# Patient Record
Sex: FEMALE | Race: WHITE | NOT HISPANIC OR LATINO | Employment: FULL TIME | URBAN - METROPOLITAN AREA
[De-identification: names, ages, dates, MRNs, and addresses within clinical notes are randomized per-mention and may not be internally consistent; named-entity substitution may affect disease eponyms.]

---

## 2017-03-27 ENCOUNTER — OFFICE VISIT (OUTPATIENT)
Dept: FAMILY MEDICINE CLINIC | Facility: CLINIC | Age: 32
End: 2017-03-27

## 2017-03-27 VITALS
OXYGEN SATURATION: 99 % | WEIGHT: 211 LBS | BODY MASS INDEX: 33.91 KG/M2 | DIASTOLIC BLOOD PRESSURE: 78 MMHG | TEMPERATURE: 98.3 F | SYSTOLIC BLOOD PRESSURE: 124 MMHG | HEIGHT: 66 IN | HEART RATE: 102 BPM

## 2017-03-27 DIAGNOSIS — H81.12 BPPV (BENIGN PAROXYSMAL POSITIONAL VERTIGO), LEFT: ICD-10-CM

## 2017-03-27 DIAGNOSIS — H65.92 MIDDLE EAR EFFUSION, LEFT: Primary | ICD-10-CM

## 2017-03-27 PROCEDURE — 99213 OFFICE O/P EST LOW 20 MIN: CPT | Performed by: NURSE PRACTITIONER

## 2017-03-27 RX ORDER — FLUTICASONE PROPIONATE 50 MCG
2 SPRAY, SUSPENSION (ML) NASAL DAILY
Qty: 1 EACH | Refills: 0 | Status: SHIPPED | OUTPATIENT
Start: 2017-03-27 | End: 2017-04-26

## 2017-03-27 NOTE — PROGRESS NOTES
"Subjective   Fiorella Smith is a 31 y.o. female who presents c/o dizzy spells x 1 month.     History of Present Illness   From sit stand, and walking feels must sit as occ seeing black spots. Has never experienced similar before. No room spinning. More off balance. Has also been sick since Friday with stomach bug. Though dizzy spells ongoing 1 month. Spells are not associated with stress or work. Can occur in the evening at home as well. Takes a minute or so to settle if stops. Yesterday had episode after workout at the gym. Does not think dehydrated, as was eating jello. gatorade and water    Does get headaches off an on for months, now more frequent, mostly hatband type. Takes ibuprofen 800, takes care of very well.   The following portions of the patient's history were reviewed and updated as appropriate: allergies, current medications, past family history, past medical history, past social history, past surgical history and problem list.    Review of Systems   Constitutional: Negative.    HENT: Positive for congestion.    Cardiovascular: Negative.  Negative for chest pain and palpitations.   Gastrointestinal: Positive for diarrhea and vomiting (day 1 only). Negative for constipation and nausea.   Allergic/Immunologic: Negative for environmental allergies.   Neurological: Positive for dizziness, light-headedness and headaches. Negative for tremors, seizures, syncope, facial asymmetry, speech difficulty, weakness and numbness.   Hematological: Negative.    Psychiatric/Behavioral: Negative.      /78 (BP Location: Right arm, Patient Position: Standing, Cuff Size: Adult)  Pulse 102  Temp 98.3 °F (36.8 °C) (Oral)   Ht 66\" (167.6 cm)  Wt 211 lb (95.7 kg)  SpO2 99%  BMI 34.06 kg/m2    Objective   Physical Exam   Constitutional: She is oriented to person, place, and time. She appears well-developed and well-nourished.   HENT:   Right Ear: No middle ear effusion.   Left Ear: A middle ear effusion is present. "   Nose: Mucosal edema and rhinorrhea present.   Mouth/Throat: Posterior oropharyngeal erythema present.   Neck: Normal range of motion. Neck supple. No JVD present. No tracheal deviation present. No thyromegaly present.   Cardiovascular: Normal rate, regular rhythm and normal heart sounds.    Pulmonary/Chest: Effort normal.   Lymphadenopathy:     She has no cervical adenopathy.   Neurological: She is alert and oriented to person, place, and time.   [place] + L   Psychiatric: She has a normal mood and affect. Her behavior is normal. Judgment and thought content normal.   Nursing note and vitals reviewed.    Assessment/Plan   Problems Addressed this Visit     None      Visit Diagnoses     Middle ear effusion, left    -  Primary    BPPV (benign paroxysmal positional vertigo), left            No worrisome findings on orthostatics. Effusion likely contributory, address with flonase and FU if not settling 1 month

## 2017-06-21 ENCOUNTER — OFFICE VISIT (OUTPATIENT)
Dept: FAMILY MEDICINE CLINIC | Facility: CLINIC | Age: 32
End: 2017-06-21

## 2017-06-21 VITALS
HEART RATE: 82 BPM | HEIGHT: 66 IN | DIASTOLIC BLOOD PRESSURE: 76 MMHG | BODY MASS INDEX: 33.75 KG/M2 | WEIGHT: 210 LBS | SYSTOLIC BLOOD PRESSURE: 124 MMHG | OXYGEN SATURATION: 96 % | TEMPERATURE: 98.5 F

## 2017-06-21 DIAGNOSIS — R89.9 ABNORMAL LABORATORY TEST RESULT: ICD-10-CM

## 2017-06-21 DIAGNOSIS — Z00.00 ROUTINE GENERAL MEDICAL EXAMINATION AT A HEALTH CARE FACILITY: ICD-10-CM

## 2017-06-21 DIAGNOSIS — R51.9 PERSISTENT HEADACHES: Primary | ICD-10-CM

## 2017-06-21 DIAGNOSIS — R59.0 LYMPHADENOPATHY OF HEAD AND NECK REGION: ICD-10-CM

## 2017-06-21 PROCEDURE — 99214 OFFICE O/P EST MOD 30 MIN: CPT | Performed by: NURSE PRACTITIONER

## 2017-06-21 RX ORDER — IBUPROFEN 800 MG/1
800 TABLET ORAL 3 TIMES DAILY PRN
Qty: 90 TABLET | Refills: 0 | COMMUNITY

## 2017-06-21 RX ORDER — AMOXICILLIN 875 MG/1
875 TABLET, COATED ORAL 2 TIMES DAILY
Qty: 20 TABLET | Refills: 0 | Status: SHIPPED | OUTPATIENT
Start: 2017-06-21

## 2017-06-21 NOTE — PROGRESS NOTES
Subjective   Fiorella Smith is a 32 y.o. female. Knot behind her R ear that is sore and she states she gets headaches as well. She noticed the knot about 2 weeks. The knot does not seem to be related to the headaches. She saw Monica about 2 weeks ago and was told she has fluid in her ears but she is not getting any better.Now she has noticed that she has a knot behind the right ear and one down the side of the right neck. She has not been running a fever, no sore throat, no ear pain other that the fluids.   She still has the headache in the right side of her head which has become persistent. She does not have associated symptoms. She took some ibuprofen which did seem to help but comes back when the ibuprofen wears off.  She did have an eye exam recently and there was no changes in her prescription glasses. The pain is persistent.    She has known elevated prolactin levels and does not know why and she needs to have this re-evaluated. She does not have a reason for this elevated level. She would like to have this checked today if possible.     Headache    This is a new problem. The current episode started more than 1 month ago. The problem has been waxing and waning. The pain is located in the right unilateral region. The pain radiates to the face. The pain quality is similar to prior headaches. The quality of the pain is described as aching and squeezing. The pain is at a severity of 7/10. Associated symptoms include ear pain, neck pain and scalp tenderness. Pertinent negatives include no blurred vision, dizziness, hearing loss, loss of balance, phonophobia, photophobia, visual change or weakness. Nothing aggravates the symptoms. She has tried NSAIDs for the symptoms. The treatment provided moderate relief. (None that she knows of)      She has some areas that are swollen on the right side of the neck and behind the right ear. The one behind the right ear is tender now. She noticed it about 2-3 weeks ago but it has not  gotten better.     The following portions of the patient's history were reviewed and updated as appropriate: allergies, current medications, past medical history, past social history and problem list.    Review of Systems   Constitutional: Positive for fatigue.   HENT: Positive for ear pain. Negative for hearing loss.         Knot on the head behind the ear and on the right side of the neck.     Eyes: Negative for blurred vision and photophobia.   Respiratory: Negative.    Cardiovascular: Negative.    Musculoskeletal: Positive for neck pain.   Neurological: Positive for headaches. Negative for dizziness, weakness and loss of balance.     Objective   Physical Exam   Constitutional: Vital signs are normal. She appears well-developed and well-nourished. She is cooperative.   HENT:   Right Ear: Hearing normal. A middle ear effusion is present.   Left Ear: Hearing and tympanic membrane normal.   Mouth/Throat: Uvula is midline, oropharynx is clear and moist and mucous membranes are normal.   Nodule post auricular on right, + nodule on the right anterior cervical nodes. Moveable.   Eyes: Conjunctivae, EOM and lids are normal. Pupils are equal, round, and reactive to light.   Neck:       Cardiovascular: Normal rate, regular rhythm and normal heart sounds.    Pulmonary/Chest: Effort normal and breath sounds normal.   Neurological: She is alert.   Psychiatric: She has a normal mood and affect. Her speech is normal.   Nursing note and vitals reviewed.    Vitals:    06/21/17 0832   BP: 124/76   Pulse: 82   Temp: 98.5 °F (36.9 °C)   SpO2: 96%       Assessment/Plan   Diagnoses and all orders for this visit:    Persistent headaches    Routine general medical examination at a health care facility  -     CBC Auto Differential  -     Comprehensive Metabolic Panel  -     Hemoglobin A1c  -     Lipid Panel  -     TSH    Lymphadenopathy of head and neck region  -     US Head Neck Soft Tissue  -     amoxicillin (AMOXIL) 875 MG tablet;  Take 1 tablet by mouth 2 (Two) Times a Day.    Abnormal laboratory test result  -     Prolactin    Headaches: Keep headache diary and bring to next appointment.   Will check some labs for health maintenance and recheck her prolactin levels which have been known to be elevated.    Get US neck and behind the right ear to evaluate the lymph nodes. Which   Follow up after this is done.

## 2017-06-22 ENCOUNTER — TELEPHONE (OUTPATIENT)
Dept: FAMILY MEDICINE CLINIC | Facility: CLINIC | Age: 32
End: 2017-06-22

## 2017-06-22 LAB
ALBUMIN SERPL-MCNC: 4.3 G/DL (ref 3.5–5.2)
ALBUMIN/GLOB SERPL: 1.5 G/DL
ALP SERPL-CCNC: 81 U/L (ref 39–117)
ALT SERPL-CCNC: 8 U/L (ref 1–33)
AST SERPL-CCNC: 11 U/L (ref 1–32)
BASOPHILS # BLD AUTO: 0.03 10*3/MM3 (ref 0–0.2)
BASOPHILS NFR BLD AUTO: 0.5 % (ref 0–1.5)
BILIRUB SERPL-MCNC: 0.3 MG/DL (ref 0.1–1.2)
BUN SERPL-MCNC: 13 MG/DL (ref 6–20)
BUN/CREAT SERPL: 18.8 (ref 7–25)
CALCIUM SERPL-MCNC: 9.7 MG/DL (ref 8.6–10.5)
CHLORIDE SERPL-SCNC: 101 MMOL/L (ref 98–107)
CHOLEST SERPL-MCNC: 180 MG/DL (ref 0–200)
CO2 SERPL-SCNC: 24.7 MMOL/L (ref 22–29)
CREAT SERPL-MCNC: 0.69 MG/DL (ref 0.57–1)
EOSINOPHIL # BLD AUTO: 0.22 10*3/MM3 (ref 0–0.7)
EOSINOPHIL NFR BLD AUTO: 3.4 % (ref 0.3–6.2)
ERYTHROCYTE [DISTWIDTH] IN BLOOD BY AUTOMATED COUNT: 13 % (ref 11.7–13)
GLOBULIN SER CALC-MCNC: 2.9 GM/DL
GLUCOSE SERPL-MCNC: 102 MG/DL (ref 65–99)
HBA1C MFR BLD: 5.48 % (ref 4.8–5.6)
HCT VFR BLD AUTO: 40.7 % (ref 35.6–45.5)
HDLC SERPL-MCNC: 50 MG/DL (ref 40–60)
HGB BLD-MCNC: 13 G/DL (ref 11.9–15.5)
IMM GRANULOCYTES # BLD: 0 10*3/MM3 (ref 0–0.03)
IMM GRANULOCYTES NFR BLD: 0 % (ref 0–0.5)
LDLC SERPL CALC-MCNC: 115 MG/DL (ref 0–100)
LYMPHOCYTES # BLD AUTO: 1.83 10*3/MM3 (ref 0.9–4.8)
LYMPHOCYTES NFR BLD AUTO: 28.3 % (ref 19.6–45.3)
MCH RBC QN AUTO: 28.6 PG (ref 26.9–32)
MCHC RBC AUTO-ENTMCNC: 31.9 G/DL (ref 32.4–36.3)
MCV RBC AUTO: 89.5 FL (ref 80.5–98.2)
MONOCYTES # BLD AUTO: 0.5 10*3/MM3 (ref 0.2–1.2)
MONOCYTES NFR BLD AUTO: 7.7 % (ref 5–12)
NEUTROPHILS # BLD AUTO: 3.88 10*3/MM3 (ref 1.9–8.1)
NEUTROPHILS NFR BLD AUTO: 60.1 % (ref 42.7–76)
PLATELET # BLD AUTO: 272 10*3/MM3 (ref 140–500)
POTASSIUM SERPL-SCNC: 4.2 MMOL/L (ref 3.5–5.2)
PROLACTIN SERPL-MCNC: 16.5 NG/ML (ref 4.8–23.3)
PROT SERPL-MCNC: 7.2 G/DL (ref 6–8.5)
RBC # BLD AUTO: 4.55 10*6/MM3 (ref 3.9–5.2)
SODIUM SERPL-SCNC: 140 MMOL/L (ref 136–145)
TRIGL SERPL-MCNC: 76 MG/DL (ref 0–150)
TSH SERPL DL<=0.005 MIU/L-ACNC: 1.34 MIU/ML (ref 0.27–4.2)
VLDLC SERPL CALC-MCNC: 15.2 MG/DL (ref 5–40)
WBC # BLD AUTO: 6.46 10*3/MM3 (ref 4.5–10.7)

## 2017-06-22 NOTE — TELEPHONE ENCOUNTER
----- Message from FRANCOISE Hanson sent at 6/22/2017 11:45 AM EDT -----  Call the patient on her labs. All labs WNL. Re-screen in one year.

## 2018-08-15 ENCOUNTER — APPOINTMENT (OUTPATIENT)
Dept: WOMENS IMAGING | Facility: HOSPITAL | Age: 33
End: 2018-08-15

## 2018-08-15 PROCEDURE — 76641 ULTRASOUND BREAST COMPLETE: CPT | Performed by: RADIOLOGY
